# Patient Record
Sex: FEMALE | Race: WHITE | HISPANIC OR LATINO
[De-identification: names, ages, dates, MRNs, and addresses within clinical notes are randomized per-mention and may not be internally consistent; named-entity substitution may affect disease eponyms.]

---

## 2018-10-22 ENCOUNTER — RESULT REVIEW (OUTPATIENT)
Age: 53
End: 2018-10-22

## 2018-11-21 ENCOUNTER — RESULT REVIEW (OUTPATIENT)
Age: 53
End: 2018-11-21

## 2019-02-06 ENCOUNTER — RESULT REVIEW (OUTPATIENT)
Age: 54
End: 2019-02-06

## 2019-02-06 ENCOUNTER — APPOINTMENT (OUTPATIENT)
Dept: BREAST CENTER | Facility: CLINIC | Age: 54
End: 2019-02-06

## 2019-02-06 VITALS
DIASTOLIC BLOOD PRESSURE: 75 MMHG | WEIGHT: 128 LBS | BODY MASS INDEX: 24.17 KG/M2 | SYSTOLIC BLOOD PRESSURE: 126 MMHG | HEIGHT: 61 IN | HEART RATE: 67 BPM

## 2019-02-06 DIAGNOSIS — Z78.9 OTHER SPECIFIED HEALTH STATUS: ICD-10-CM

## 2019-02-06 DIAGNOSIS — R92.8 OTHER ABNORMAL AND INCONCLUSIVE FINDINGS ON DIAGNOSTIC IMAGING OF BREAST: ICD-10-CM

## 2019-02-06 DIAGNOSIS — Z80.1 FAMILY HISTORY OF MALIGNANT NEOPLASM OF TRACHEA, BRONCHUS AND LUNG: ICD-10-CM

## 2019-02-06 DIAGNOSIS — Z80.3 FAMILY HISTORY OF MALIGNANT NEOPLASM OF BREAST: ICD-10-CM

## 2019-02-06 PROBLEM — Z00.00 ENCOUNTER FOR PREVENTIVE HEALTH EXAMINATION: Status: ACTIVE | Noted: 2019-02-06

## 2019-02-06 RX ORDER — MINOCYCLINE HYDROCHLORIDE 100 MG/1
100 CAPSULE ORAL
Qty: 60 | Refills: 0 | Status: ACTIVE | COMMUNITY
Start: 2018-06-28

## 2019-02-06 NOTE — HISTORY OF PRESENT ILLNESS
[FreeTextEntry1] : 54 y/o female referred for consultation for abnormal u/s and ? of rupture of left breast implant per Dr. Jeanette Perry MD.  \par Pt states her left implant has been getting firmer in recent past. [facility] \par 10/31/18 CDR ([facility]) Bilt mmg and u/s, Bilat Retropec implants, 10 mm mass in right RA decreased from 2013, correlates with complex cyst on u/s. Prominent high density LN in bilat axilla up to 1.7 cm on R and 1.8 cm on left. BR2\par Bilat u/s with R LN 2.4 compatible with silicon in LN, Left 9:00, 1.5 N complex cysts 1.4 and 6 mm and 10:00 7N 5 mm. 6 mos f.u u/s rec for probable silicon globules, RA 3x4 complex cyst for which 6 mos f.u rec. Multiple area in left compatible with silicon.  \par 12/1418 [facility] MRI, Bilat retropec implants without evidence of rupture. Left with fluid around implant, susp left axillary LN.  Rec as of fluid and bx.  Large cell lymphoma cannot be excluded.   \par 1/16/19 [facility], left u/s, no evidence of CA, consistent findings of intracapsular and extracapsular rupture. Surgical consult rec, BR2. \par PGM with breast cancer at 64, mother with metastatic cancer-? primary.

## 2019-02-06 NOTE — ASSESSMENT
[FreeTextEntry1] : 54 y/o female referred for consultation for abnormal u/s and ? of rupture of left breast implant per Dr. Jeanette Perry MD.  \par Pt states her left implant has been getting firmer in recent past. They were placed many years in [facility]. \par 10/31/18 CDR ([facility]) Bilt mmg and u/s, Bilat Retropec implants, 10 mm mass in right RA decreased from 2013, correlates with complex cyst on u/s. Prominent high density LN in bilat axilla up to 1.7 cm on R and 1.8 cm on left. BR2\par Bilat u/s with R LN 2.4 compatible with silicon in LN, Left 9:00, 1.5 N complex cysts 1.4 and 6 mm and 10:00 7N 5 mm. 6 mos f.u u/s rec for probable silicon globules, RA 3x4 complex cyst for which 6 mos f.u rec. Multiple area in left compatible with silicon.  \par 12/1418 [facility] MRI, Bilat retropec implants without evidence of rupture. Left with fluid around implant, susp left axillary LN.  Rec as of fluid and bx.  Large cell lymphoma cannot be excluded.   \par 1/16/19 [facility], left u/s, no evidence of CA, consistent findings of intracapsular and extracapsular rupture. Surgical consult rec, BR2. \par PGM with breast cancer at 64, mother with metastatic cancer-? primary.\par CBE: Right implant, Left capsule, no adenopathy.

## 2019-02-06 NOTE — PHYSICAL EXAM
[Normocephalic] : normocephalic [Atraumatic] : atraumatic [Supple] : supple [No Supraclavicular Adenopathy] : no supraclavicular adenopathy [Examined in the supine and seated position] : examined in the supine and seated position [Asymmetrical] : asymmetrical [Bra Size: ___] : Bra Size: [unfilled] [No dominant masses] : no dominant masses in right breast  [No dominant masses] : no dominant masses left breast [No Nipple Retraction] : no left nipple retraction [No Nipple Discharge] : no left nipple discharge [No Axillary Lymphadenopathy] : no left axillary lymphadenopathy [No Edema] : no edema [No Rashes] : no rashes [No Ulceration] : no ulceration [de-identified] : Bilat breast implants [de-identified] : capsular contracture

## 2019-02-06 NOTE — DATA REVIEWED
[FreeTextEntry1] : 10/31/18 CDR ([facility]) Bilt mmg and u/s, Bilat Retropec implants, 10 mm mass in right RA decreased from 2013, correlates with complex cyst on u/s. Prominent high density LN in bilat axilla up to 1.7 cm on R and 1.8 cm on left. BR2\par Bilat u/s with R LN 2.4 compatible with silicon in LN, Left 9:00, 1.5 N complex cysts 1.4 and 6 mm and 10:00 7N 5 mm. 6 mos f.u u/s rec for probable silicon globules.  RA 3x4 complex cyst for which 6 mos f.u rec. Multiple area in left compatible with silicon. BR3\par 12/1418 [facility] MRI, Bilat retropec implants without evidence of rupture. Left with fluid around implant, susp left axillary LN.  Rec as of fluid and bx.  Large cell lymphoma cannot be excluded.   \par 1/16/19 [facility], left u/s, no evidence of CA, consistent findings of intracapsular and extracapsular rupture. Surgical consult rec, BR2. \par

## 2019-02-06 NOTE — PAST MEDICAL HISTORY
[Perimenopausal] : The patient is perimenopausal [Menarche Age ____] : age at menarche was [unfilled] [Approximately ___] : the LMP was approximately [unfilled] [Living ___] : Living: [unfilled] [Age At Live Birth ___] : Age at live birth: [unfilled] [FreeTextEntry6] : none [FreeTextEntry7] : 5 years [FreeTextEntry8] : 6 mos

## 2019-02-07 ENCOUNTER — MESSAGE (OUTPATIENT)
Age: 54
End: 2019-02-07

## 2019-03-12 ENCOUNTER — APPOINTMENT (OUTPATIENT)
Dept: PLASTIC SURGERY | Facility: CLINIC | Age: 54
End: 2019-03-12

## 2019-03-13 ENCOUNTER — RESULT REVIEW (OUTPATIENT)
Age: 54
End: 2019-03-13

## 2019-03-19 ENCOUNTER — APPOINTMENT (OUTPATIENT)
Dept: PLASTIC SURGERY | Facility: CLINIC | Age: 54
End: 2019-03-19
Payer: COMMERCIAL

## 2019-03-19 VITALS
BODY MASS INDEX: 24.55 KG/M2 | HEART RATE: 68 BPM | DIASTOLIC BLOOD PRESSURE: 71 MMHG | SYSTOLIC BLOOD PRESSURE: 103 MMHG | WEIGHT: 130 LBS | HEIGHT: 61 IN

## 2019-03-19 PROCEDURE — 99204 OFFICE O/P NEW MOD 45 MIN: CPT

## 2019-03-19 NOTE — PHYSICAL EXAM
[Bra Size: _______] : Bra Size: [unfilled] [NI] : Normal [de-identified] : L>R, bilateral submuscular implants in place, no masses, no rashes, no scars, nipple retraction, ulcers, high grade capsular contracture bilaterally, right grade II/III, left breast grade III/IV, BD 14cm Rt, LtBD 13cm breast mounds touching medially "pseudo" synmastia    [de-identified] : soft, NT, ND, no rashes, no ulceration

## 2019-03-19 NOTE — HISTORY OF PRESENT ILLNESS
[FreeTextEntry1] : 52 y/o F referred from Dr. Ballard for consultation for possible rupture of left breast implant. Patient states her left breast implant has been getting firmer in the recent past. They were placed many years ago in [facility] (2003). MRI performed on 12/14/18 shows b/l retropectoral implants w/o evidence of rupture and fluid surrounding the left breast implant, plus morphologically suspicious left axillary lymph nodes. US performed on 1/16/19 shows findings consistent with intracapsular and extracapsular rupture of the patient’s left breast implant.

## 2019-03-19 NOTE — ASSESSMENT
[FreeTextEntry1] : Ms. PAK  has severe capsule contracture, and pain with asymmetry and ruptured silicone implant she is interested in removal of the implants and exchange, as well as total capsulectomy with new implant. \par \par I reviewed with the patient the risks benefits and alternatives of breast implant exchange with silicone implants. The goal of the operation is to create a more enlarged breast mound by placing the implant in either the subglandular or dual plane/sub muscular position. The access incision can either be IMF or periareolar. IMF incision has less disruption of the breast gland ducts and therefore theoretically less chance of bacterial contamination and biofilm creation which is thought to be associated with capsular contracture. Submuscular placement of the implant has less risk of visible or palpable implant in the upper pole as well as theoretical benefit of decreased capsule contracture, however, it is associated with muscle animation deformity and greater postop pain. We discussed the differences between saline implants and silicone implants and the FDA recommendation for MRI surveillance postoperatively to rule out ruptured silicone implants. The risks of the surgery include the risk implant related complications such as implant infection, implant rupture, capsule contracture, implant malposition, palpable and visible rippling and need for revision surgery as well as asymmetry of the breasts, loss of nipple sensation ( or hypersensitivity), loss of the ability to breast feed, need for specialized techniques during mammography for breast cancer screening and likelihood that she will need revision surgery in the future as the implants are not designed to last a lifetime. \par \par I explained that the final size will be determined intra-operatively but she states that she wants to be bigger on the right and smaller on the left - most likely this will be achieved with capsulectomy and implant exchange. We will likely not be able to make a wider space between the breast mounds if she wants to maintain her current size or even be larger.\par \par

## 2019-03-20 DIAGNOSIS — R59.0 LOCALIZED ENLARGED LYMPH NODES: ICD-10-CM

## 2019-04-05 ENCOUNTER — RESULT REVIEW (OUTPATIENT)
Age: 54
End: 2019-04-05

## 2019-04-05 ENCOUNTER — APPOINTMENT (OUTPATIENT)
Dept: ULTRASOUND IMAGING | Facility: HOSPITAL | Age: 54
End: 2019-04-05
Payer: COMMERCIAL

## 2019-04-05 ENCOUNTER — APPOINTMENT (OUTPATIENT)
Dept: ULTRASOUND IMAGING | Facility: HOSPITAL | Age: 54
End: 2019-04-05

## 2019-04-05 ENCOUNTER — OUTPATIENT (OUTPATIENT)
Dept: OUTPATIENT SERVICES | Facility: HOSPITAL | Age: 54
LOS: 1 days | End: 2019-04-05
Payer: COMMERCIAL

## 2019-04-05 PROCEDURE — 19000 PUNCTURE ASPIR CYST BREAST: CPT | Mod: LT

## 2019-04-05 PROCEDURE — 76942 ECHO GUIDE FOR BIOPSY: CPT | Mod: 26

## 2019-04-08 LAB — HEMATOPATHOLOGY REPORT: SIGNIFICANT CHANGE UP

## 2019-05-03 ENCOUNTER — RESULT REVIEW (OUTPATIENT)
Age: 54
End: 2019-05-03

## 2019-05-03 ENCOUNTER — OUTPATIENT (OUTPATIENT)
Dept: OUTPATIENT SERVICES | Facility: HOSPITAL | Age: 54
LOS: 1 days | Discharge: HOME | End: 2019-05-03
Payer: COMMERCIAL

## 2019-05-03 PROCEDURE — 19371 PERI-IMPLT CAPSLC BRST COMPL: CPT | Mod: 50

## 2019-05-03 PROCEDURE — 19325 BREAST AUGMENTATION W/IMPLT: CPT | Mod: 50

## 2019-05-03 PROCEDURE — 19330 RMVL RUPTURED BREAST IMPLANT: CPT | Mod: LT,59

## 2019-05-03 RX ORDER — OXYCODONE AND ACETAMINOPHEN 5; 325 MG/1; MG/1
5-325 TABLET ORAL
Qty: 20 | Refills: 0 | Status: ACTIVE | COMMUNITY
Start: 2019-05-03 | End: 1900-01-01

## 2019-05-07 ENCOUNTER — APPOINTMENT (OUTPATIENT)
Dept: PLASTIC SURGERY | Facility: CLINIC | Age: 54
End: 2019-05-07
Payer: COMMERCIAL

## 2019-05-07 PROCEDURE — 99024 POSTOP FOLLOW-UP VISIT: CPT

## 2019-05-14 LAB — SURGICAL PATHOLOGY STUDY: SIGNIFICANT CHANGE UP

## 2019-05-17 ENCOUNTER — TRANSCRIPTION ENCOUNTER (OUTPATIENT)
Age: 54
End: 2019-05-17

## 2019-05-17 ENCOUNTER — INPATIENT (INPATIENT)
Facility: HOSPITAL | Age: 54
LOS: 0 days | Discharge: HOME | DRG: 580 | End: 2019-05-17
Attending: PLASTIC SURGERY | Admitting: PLASTIC SURGERY
Payer: COMMERCIAL

## 2019-05-17 ENCOUNTER — CHART COPY (OUTPATIENT)
Age: 54
End: 2019-05-17

## 2019-05-17 VITALS
HEART RATE: 86 BPM | WEIGHT: 119.93 LBS | DIASTOLIC BLOOD PRESSURE: 91 MMHG | RESPIRATION RATE: 16 BRPM | OXYGEN SATURATION: 100 % | SYSTOLIC BLOOD PRESSURE: 126 MMHG | TEMPERATURE: 98 F | HEIGHT: 61 IN

## 2019-05-17 VITALS
SYSTOLIC BLOOD PRESSURE: 129 MMHG | DIASTOLIC BLOOD PRESSURE: 74 MMHG | HEART RATE: 90 BPM | RESPIRATION RATE: 12 BRPM | OXYGEN SATURATION: 95 %

## 2019-05-17 VITALS
BODY MASS INDEX: 24.55 KG/M2 | WEIGHT: 130 LBS | HEIGHT: 61 IN | HEART RATE: 78 BPM | TEMPERATURE: 97.1 F | SYSTOLIC BLOOD PRESSURE: 121 MMHG | DIASTOLIC BLOOD PRESSURE: 79 MMHG

## 2019-05-17 DIAGNOSIS — M96.841 POSTPROCEDURAL HEMATOMA OF A MUSCULOSKELETAL STRUCTURE FOLLOWING OTHER PROCEDURE: ICD-10-CM
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
## 2019-05-17 DIAGNOSIS — L76.32 POSTPROCEDURAL HEMATOMA OF SKIN AND SUBCUTANEOUS TISSUE FOLLOWING OTHER PROCEDURE: ICD-10-CM

## 2019-05-17 DIAGNOSIS — T85.41XA BREAKDOWN (MECHANICAL) OF BREAST PROSTHESIS AND IMPLANT, INITIAL ENCOUNTER: ICD-10-CM

## 2019-05-17 LAB
ALBUMIN SERPL ELPH-MCNC: 4.1 G/DL — SIGNIFICANT CHANGE UP (ref 3.3–5)
ALP SERPL-CCNC: 85 U/L — SIGNIFICANT CHANGE UP (ref 40–120)
ALT FLD-CCNC: 13 U/L — SIGNIFICANT CHANGE UP (ref 10–45)
ANION GAP SERPL CALC-SCNC: 9 MMOL/L — SIGNIFICANT CHANGE UP (ref 5–17)
APTT BLD: 29.4 SEC — SIGNIFICANT CHANGE UP (ref 27.5–36.3)
AST SERPL-CCNC: 28 U/L — SIGNIFICANT CHANGE UP (ref 10–40)
BASOPHILS # BLD AUTO: 0.03 K/UL — SIGNIFICANT CHANGE UP (ref 0–0.2)
BASOPHILS NFR BLD AUTO: 0.6 % — SIGNIFICANT CHANGE UP (ref 0–2)
BILIRUB SERPL-MCNC: 1.2 MG/DL — SIGNIFICANT CHANGE UP (ref 0.2–1.2)
BLD GP AB SCN SERPL QL: NEGATIVE — SIGNIFICANT CHANGE UP
BUN SERPL-MCNC: 12 MG/DL — SIGNIFICANT CHANGE UP (ref 7–23)
CALCIUM SERPL-MCNC: 9.3 MG/DL — SIGNIFICANT CHANGE UP (ref 8.4–10.5)
CHLORIDE SERPL-SCNC: 106 MMOL/L — SIGNIFICANT CHANGE UP (ref 96–108)
CO2 SERPL-SCNC: 25 MMOL/L — SIGNIFICANT CHANGE UP (ref 22–31)
CREAT SERPL-MCNC: 0.52 MG/DL — SIGNIFICANT CHANGE UP (ref 0.5–1.3)
EOSINOPHIL # BLD AUTO: 0.16 K/UL — SIGNIFICANT CHANGE UP (ref 0–0.5)
EOSINOPHIL NFR BLD AUTO: 3 % — SIGNIFICANT CHANGE UP (ref 0–6)
GLUCOSE SERPL-MCNC: 94 MG/DL — SIGNIFICANT CHANGE UP (ref 70–99)
HCT VFR BLD CALC: 31.7 % — LOW (ref 34.5–45)
HGB BLD-MCNC: 9.6 G/DL — LOW (ref 11.5–15.5)
IMM GRANULOCYTES NFR BLD AUTO: 0.2 % — SIGNIFICANT CHANGE UP (ref 0–1.5)
INR BLD: 1.04 — SIGNIFICANT CHANGE UP (ref 0.88–1.16)
LYMPHOCYTES # BLD AUTO: 0.93 K/UL — LOW (ref 1–3.3)
LYMPHOCYTES # BLD AUTO: 17.3 % — SIGNIFICANT CHANGE UP (ref 13–44)
MCHC RBC-ENTMCNC: 26.8 PG — LOW (ref 27–34)
MCHC RBC-ENTMCNC: 30.3 GM/DL — LOW (ref 32–36)
MCV RBC AUTO: 88.5 FL — SIGNIFICANT CHANGE UP (ref 80–100)
MONOCYTES # BLD AUTO: 0.49 K/UL — SIGNIFICANT CHANGE UP (ref 0–0.9)
MONOCYTES NFR BLD AUTO: 9.1 % — SIGNIFICANT CHANGE UP (ref 2–14)
NEUTROPHILS # BLD AUTO: 3.76 K/UL — SIGNIFICANT CHANGE UP (ref 1.8–7.4)
NEUTROPHILS NFR BLD AUTO: 69.8 % — SIGNIFICANT CHANGE UP (ref 43–77)
NRBC # BLD: 0 /100 WBCS — SIGNIFICANT CHANGE UP (ref 0–0)
PLATELET # BLD AUTO: 356 K/UL — SIGNIFICANT CHANGE UP (ref 150–400)
POTASSIUM SERPL-MCNC: 4.8 MMOL/L — SIGNIFICANT CHANGE UP (ref 3.5–5.3)
POTASSIUM SERPL-SCNC: 4.8 MMOL/L — SIGNIFICANT CHANGE UP (ref 3.5–5.3)
PROT SERPL-MCNC: 8 G/DL — SIGNIFICANT CHANGE UP (ref 6–8.3)
PROTHROM AB SERPL-ACNC: 11.8 SEC — SIGNIFICANT CHANGE UP (ref 10–12.9)
RBC # BLD: 3.58 M/UL — LOW (ref 3.8–5.2)
RBC # FLD: 16 % — HIGH (ref 10.3–14.5)
RH IG SCN BLD-IMP: POSITIVE — SIGNIFICANT CHANGE UP
SODIUM SERPL-SCNC: 140 MMOL/L — SIGNIFICANT CHANGE UP (ref 135–145)
WBC # BLD: 5.38 K/UL — SIGNIFICANT CHANGE UP (ref 3.8–10.5)
WBC # FLD AUTO: 5.38 K/UL — SIGNIFICANT CHANGE UP (ref 3.8–10.5)

## 2019-05-17 PROCEDURE — 19000 PUNCTURE ASPIR CYST BREAST: CPT

## 2019-05-17 PROCEDURE — 10005 FNA BX W/US GDN 1ST LES: CPT

## 2019-05-17 PROCEDURE — 76942 ECHO GUIDE FOR BIOPSY: CPT

## 2019-05-17 PROCEDURE — 99285 EMERGENCY DEPT VISIT HI MDM: CPT

## 2019-05-17 PROCEDURE — 35820 EXPLORE CHEST VESSELS: CPT | Mod: 78

## 2019-05-17 RX ORDER — METOCLOPRAMIDE HCL 10 MG
10 TABLET ORAL ONCE
Refills: 0 | Status: DISCONTINUED | OUTPATIENT
Start: 2019-05-17 | End: 2019-05-17

## 2019-05-17 RX ORDER — HYDROMORPHONE HYDROCHLORIDE 2 MG/ML
0.5 INJECTION INTRAMUSCULAR; INTRAVENOUS; SUBCUTANEOUS
Refills: 0 | Status: DISCONTINUED | OUTPATIENT
Start: 2019-05-17 | End: 2019-05-17

## 2019-05-17 RX ORDER — ONDANSETRON 8 MG/1
4 TABLET, FILM COATED ORAL EVERY 6 HOURS
Refills: 0 | Status: DISCONTINUED | OUTPATIENT
Start: 2019-05-17 | End: 2019-05-17

## 2019-05-17 RX ORDER — OXYCODONE HYDROCHLORIDE 5 MG/1
5 TABLET ORAL EVERY 4 HOURS
Refills: 0 | Status: DISCONTINUED | OUTPATIENT
Start: 2019-05-17 | End: 2019-05-17

## 2019-05-17 RX ORDER — ACETAMINOPHEN 500 MG
2 TABLET ORAL
Qty: 0 | Refills: 0 | DISCHARGE

## 2019-05-17 RX ORDER — SODIUM CHLORIDE 9 MG/ML
1000 INJECTION, SOLUTION INTRAVENOUS
Refills: 0 | Status: DISCONTINUED | OUTPATIENT
Start: 2019-05-17 | End: 2019-05-17

## 2019-05-17 RX ORDER — DIPHENHYDRAMINE HCL 50 MG
12.5 CAPSULE ORAL EVERY 4 HOURS
Refills: 0 | Status: DISCONTINUED | OUTPATIENT
Start: 2019-05-17 | End: 2019-05-17

## 2019-05-17 NOTE — BRIEF OPERATIVE NOTE - NSICDXBRIEFPREOP_GEN_ALL_CORE_FT
PRE-OP DIAGNOSIS:  Breast hematoma 17-May-2019 16:58:01  Lenny Dee  H/O breast augmentation 17-May-2019 16:57:43  Lenny Dee

## 2019-05-17 NOTE — ED PROVIDER NOTE - CLINICAL SUMMARY MEDICAL DECISION MAKING FREE TEXT BOX
Pt with recent breast reimplantation 5/3 now with left breast swelling and ecchymoses sent in for preop for OR intervention.  Discussed with Dr. Lerman.

## 2019-05-17 NOTE — ED PROVIDER NOTE - OBJECTIVE STATEMENT
52 y/o f with PMH of breast augmentation originally 15 years prior now with replacement on 5/3 by Dr. Lerman presents today for swelling of left breast and bruising.  Sent by Dr. Lerman for OR intervention.  Denies fever or chills.  No arm pain or arm swelling.  No chest pain or shortness of breath.

## 2019-05-17 NOTE — DISCHARGE NOTE PROVIDER - NSDCFUADDAPPT_GEN_ALL_CORE_FT
Please follow up with Dr. Lerman within x1 week after discharge from the hospital. You may call his office to schedule an appointment.

## 2019-05-17 NOTE — BRIEF OPERATIVE NOTE - OPERATION/FINDINGS
Preoperative Diagnosis: s/p bilateral implant exchange and capsulectomies (5/3) c/b left breast hematoma  Procedure: Evacuation of left breast hematoma (400-500cc); Removal of implant; Insertion of new implant  Postoperative Diagnosis: s/p bilateral implant exchange and capsulectomies (5/3) c/b left breast hematoma

## 2019-05-17 NOTE — ED ADULT NURSE NOTE - CHPI ED NUR SYMPTOMS NEG
no vomiting/no tingling/no chills/no fever/no decreased eating/drinking/no nausea/no dizziness/no weakness

## 2019-05-17 NOTE — H&P ADULT - ASSESSMENT
53F s/p implant exchange on 5/3 by Dr. Lerman presents to [facility] emergency department with left breast hematoma.  >> RTOR for left breast hematoma evacuation.  >> Labs pending.  >> NPO  >> IVF    [facility] Plastic Surgery Pager -- [phone]  [facility] Plastic Surgery Pager -- [phone]  [facility] Plastic Surgery Pager -- [phone]

## 2019-05-17 NOTE — DISCHARGE NOTE PROVIDER - HOSPITAL COURSE
53F s/p bilateral implant exchange and capsulectomies (5/3) c/b left breast hematoma (5/17) underwent evacuation of hematoma in the operating room at [facility]. The patient tolerated the procedure well. There were no complications.

## 2019-05-17 NOTE — H&P ADULT - HISTORY OF PRESENT ILLNESS
53F s/p implant exchange on 5/3 by Dr. Lerman presents to [facility] emergency department with left breast hematoma.

## 2019-05-17 NOTE — DISCHARGE NOTE PROVIDER - CARE PROVIDER_API CALL
Lerman, Oren Z (MD)  Plastic Surgery  [address] [place] [address]  Phone: [phone]  Fax: [phone]  Follow Up Time: 1 week

## 2019-05-17 NOTE — DISCHARGE NOTE NURSING/CASE MANAGEMENT/SOCIAL WORK - NSDCDPATPORTLINK_GEN_ALL_CORE
You can access the [facility] Portal, offered by [facility], by registering with the following website: [email or web address]

## 2019-05-17 NOTE — ED ADULT TRIAGE NOTE - CHIEF COMPLAINT QUOTE
sent down from Dr. Palumbo's office for pre-op workup.  Patient is s/p bilateral breast augmentation 2 wks ago.  c/o left breast hematoma / pain.

## 2019-05-17 NOTE — H&P ADULT - NSHPPHYSICALEXAM_GEN_ALL_CORE
>> General: Well-developed. Well-nourished. No acute distress.  >> Neck: Supple.  >> Cardiovascular: RRR. S1, S2.  >> Lungs: Bilateral breath sounds.   >> Breast: Left breast ecchymosis and marked swelling.  >> Abdomen: Soft. Non-tender. Non-distended.  >> Extremities: Unremarkable.

## 2019-05-17 NOTE — ED ADULT NURSE NOTE - OBJECTIVE STATEMENT
53 year old F patient with c/o of L breast pain s/p breast augmentation on may 3.  Pt states she had implants removed, bialterally and new ones placed.  Hematoma noted to L breast, states "everything just happened suddenly yesterday".  L breast appears swollen with multiple bruising.  Denies n/v/d/f.  NO discharge from nipples.

## 2019-05-21 ENCOUNTER — APPOINTMENT (OUTPATIENT)
Dept: PLASTIC SURGERY | Facility: CLINIC | Age: 54
End: 2019-05-21
Payer: COMMERCIAL

## 2019-05-21 DIAGNOSIS — T85.43XA LEAKAGE OF BREAST PROSTHESIS AND IMPLANT, INITIAL ENCOUNTER: ICD-10-CM

## 2019-05-21 PROCEDURE — 85610 PROTHROMBIN TIME: CPT

## 2019-05-21 PROCEDURE — C1789: CPT

## 2019-05-21 PROCEDURE — 85730 THROMBOPLASTIN TIME PARTIAL: CPT

## 2019-05-21 PROCEDURE — 36415 COLL VENOUS BLD VENIPUNCTURE: CPT

## 2019-05-21 PROCEDURE — 85025 COMPLETE CBC W/AUTO DIFF WBC: CPT

## 2019-05-21 PROCEDURE — 99024 POSTOP FOLLOW-UP VISIT: CPT

## 2019-05-21 PROCEDURE — 86901 BLOOD TYPING SEROLOGIC RH(D): CPT

## 2019-05-21 PROCEDURE — 99285 EMERGENCY DEPT VISIT HI MDM: CPT

## 2019-05-21 PROCEDURE — 80053 COMPREHEN METABOLIC PANEL: CPT

## 2019-05-21 PROCEDURE — 86900 BLOOD TYPING SEROLOGIC ABO: CPT

## 2019-05-21 PROCEDURE — 86850 RBC ANTIBODY SCREEN: CPT

## 2019-05-21 PROCEDURE — 84702 CHORIONIC GONADOTROPIN TEST: CPT

## 2019-05-21 RX ORDER — DOXYCYCLINE HYCLATE 100 MG/1
100 CAPSULE ORAL
Qty: 7 | Refills: 0 | Status: ACTIVE | COMMUNITY
Start: 2019-05-17

## 2019-05-21 RX ORDER — IBUPROFEN 600 MG/1
600 TABLET, FILM COATED ORAL
Qty: 40 | Refills: 0 | Status: ACTIVE | COMMUNITY
Start: 2019-03-13

## 2019-05-21 NOTE — SURGICAL HISTORY
[de-identified] : 5/3/19: s/p b/l capsulectomy, implant removal and implant replacement on 5/3/19. [de-identified] : 5/17/19--left breast evacuation of hematoma washout with implant replacement

## 2019-05-21 NOTE — PHYSICAL EXAM
[NI] : Normal [de-identified] : BL implants in good position, IMF incisions c/d/i, no collection or infection, normal PO ecchymosis L >R

## 2019-05-21 NOTE — HISTORY OF PRESENT ILLNESS
[FreeTextEntry1] : S/p b/l capsulectomy, implant removal and implant replacement on 5/3/19 complicated by post op hematoma. Now POD 4 s/p evacuation of hematoma with implant removal and exchange on 5/17/19.\par Doing well. Pain is controlled. No f/c/n/v/d. She continues taking prophylactic Doxycycline x 1 week. \par PO instructions and activity restrictions discussed with patient \par

## 2019-05-21 NOTE — ASSESSMENT
[FreeTextEntry1] : PO Instructions reviewed\par Compression / Sports Bra\par No strenuous activity\par F/U in 1 week

## 2019-05-28 ENCOUNTER — APPOINTMENT (OUTPATIENT)
Dept: PLASTIC SURGERY | Facility: CLINIC | Age: 54
End: 2019-05-28

## 2019-05-28 NOTE — HISTORY OF PRESENT ILLNESS
[FreeTextEntry1] : Here for 2nd PO visit s/p evacuation of hematoma with implant removal and exchange on 5/17/19\par Doing well. Pain is controlled. No f/c/n/v/d.\par

## 2019-05-28 NOTE — ASSESSMENT
[FreeTextEntry1] : s/p evacuation of hematoma with implant removal and exchange on 5/17/19\par Healing well\par PO Instructions reviewed\par Compression / Sports Bra\par No strenuous activity\par F/U in 2 weeks

## 2019-05-28 NOTE — SURGICAL HISTORY
[de-identified] : 5/3/19: s/p b/l capsulectomy, implant removal and implant replacement on 5/3/19. [de-identified] : 5/17/19--left breast evacuation of hematoma washout with implant replacement

## 2019-05-28 NOTE — PHYSICAL EXAM
[de-identified] : BL implants in good position, IMF incisions c/d/i, no collection or infection, normal PO ecchymosis L >R [NI] : Normal
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
## 2019-05-29 DIAGNOSIS — N64.4 MASTODYNIA: ICD-10-CM
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
## 2019-05-29 DIAGNOSIS — L76.31 POSTPROCEDURAL HEMATOMA OF SKIN AND SUBCUTANEOUS TISSUE FOLLOWING A DERMATOLOGIC PROCEDURE: ICD-10-CM

## 2019-06-07 NOTE — ASSESSMENT
[FreeTextEntry1] : POD 4 s/p s/p b/l capsulectomy, implant removal and implant replacement on 5/3/19\par Healing well\par activity restrictions discussed with patient\par f.u in 3 weeks

## 2019-06-07 NOTE — HISTORY OF PRESENT ILLNESS
[FreeTextEntry1] : POD 4\par Healing well. Implants in good position.\par s/p b/l capsulectomy, implant removal and implant replacement on 5/3/19\par Doing well. Pain is controlled. No f/c/n/v/d.\par Activity restrictions discussed with patient \par Follow up in ***

## 2019-06-07 NOTE — SURGICAL HISTORY
[de-identified] : 5/3/19: s/p b/l capsulectomy, implant removal and implant replacement on 5/3/19.

## 2019-06-07 NOTE — PHYSICAL EXAM
[NI] : Normal [de-identified] : BL implants in good position, IMF incisions c/d/i, no collection or infection

## 2019-06-25 ENCOUNTER — APPOINTMENT (OUTPATIENT)
Dept: PLASTIC SURGERY | Facility: CLINIC | Age: 54
End: 2019-06-25
Payer: COMMERCIAL

## 2019-06-25 DIAGNOSIS — T85.44XA CAPSULAR CONTRACTURE OF BREAST IMPLANT, INITIAL ENCOUNTER: ICD-10-CM

## 2019-06-25 PROCEDURE — 99024 POSTOP FOLLOW-UP VISIT: CPT

## 2019-06-25 NOTE — PHYSICAL EXAM
[NI] : Normal [de-identified] : BL implants in good position, IMF incisions c/d/i, no collection or infection, baker grade 0, healing well, no synmastia

## 2019-06-25 NOTE — SURGICAL HISTORY
[de-identified] : 5/17/19--left breast evacuation of hematoma washout with implant replacement  [de-identified] : 5/3/19: s/p b/l capsulectomy, implant removal and implant replacement on 5/3/19.

## 2019-06-25 NOTE — ASSESSMENT
[FreeTextEntry1] : s/p evacuation of hematoma with implant removal and exchange on 5/17/19\par Healing well\par Continue silicone strips/cream\par I reviewed scar management treatments including sun avoidance, sunblock, moisturizer, and massage.\par F/U in 6 months

## 2019-06-25 NOTE — HISTORY OF PRESENT ILLNESS
[FreeTextEntry1] : Here for 3rd PO visit s/p evacuation of hematoma with implant removal and exchange on 5/17/19\par Doing well. Pain is controlled. Using silicone cream to incisions.

## 2020-02-10 NOTE — SURGICAL HISTORY
[de-identified] : 5/3/19: s/p b/l capsulectomy, implant removal and implant replacement on 5/3/19. [de-identified] : 5/17/19--left breast evacuation of hematoma washout with implant replacement

## 2020-02-10 NOTE — HISTORY OF PRESENT ILLNESS
[FreeTextEntry1] : Here for 4th PO visit 9 months s/p evacuation of hematoma with implant removal and exchange on 5/17/19\par Doing well. Pain is controlled. Using silicone cream to incisions.

## 2020-02-10 NOTE — PHYSICAL EXAM
[NI] : Normal [de-identified] : BL implants in good position, IMF incisions c/d/i, no collection or infection, baker grade 0, healing well, no synmastia

## 2020-02-11 ENCOUNTER — APPOINTMENT (OUTPATIENT)
Dept: PLASTIC SURGERY | Facility: CLINIC | Age: 55
End: 2020-02-11

## 2020-02-12 NOTE — PHYSICAL EXAM
[NI] : Normal [de-identified] : BL implants in good position, IMF incisions c/d/i, no collection or infection, baker grade 0, healing well, no synmastia

## 2020-02-12 NOTE — SURGICAL HISTORY
[de-identified] : 5/3/19: s/p b/l capsulectomy, implant removal and implant replacement on 5/3/19. [de-identified] : 5/17/19--left breast evacuation of hematoma washout with implant replacement

## 2020-02-18 ENCOUNTER — APPOINTMENT (OUTPATIENT)
Dept: PLASTIC SURGERY | Facility: CLINIC | Age: 55
End: 2020-02-18

## 2020-03-17 ENCOUNTER — APPOINTMENT (OUTPATIENT)
Dept: PLASTIC SURGERY | Facility: CLINIC | Age: 55
End: 2020-03-17

## 2020-05-25 NOTE — HISTORY OF PRESENT ILLNESS
[FreeTextEntry1] : 55 y/o female 1 year PO s/p evacuation of hematoma with implant removal and exchange on 5/17/19.

## 2020-05-25 NOTE — SURGICAL HISTORY
[de-identified] : 5/17/19--left breast evacuation of hematoma washout with implant replacement  [de-identified] : 5/3/19: s/p b/l capsulectomy, implant removal and implant replacement on 5/3/19.

## 2020-05-26 ENCOUNTER — APPOINTMENT (OUTPATIENT)
Dept: PLASTIC SURGERY | Facility: CLINIC | Age: 55
End: 2020-05-26

## 2020-07-10 NOTE — END OF VISIT
Subjective:   Patient ID:  Maria Ines Ac is a 36 y.o. female who presents for follow-up of Chest Pain  Maria Ines Ac is a 35 y.o. female is a new patient who presents for evaluation of Chest Pain; Palpitations; Shortness of Breath; and Surgery Clearance     HPI:   Pre op evaluation for breast reduction. Dr. Brian  Navarro a murmur.  DM, Morbid obesity.   Patient would get SOB on going up a flight of stair.   No chest pain, Orthopnea, PND of heart failure symptoms.   c/o palpitations or fluttering in the chest        HPI:   Return follow up to discuss her holter patient still c/o significant palpitations on and off  On holter there is frequent PVCs and sinus tachycardia   There were very frequent PVCs totalling 6794 and averaging 141.54 per hour. The ventricular arrhythmias percentage was 2.4. There were 1 couplets.   No chest pain, Orthopnea, PND of heart failure symptoms.      Echo:  · Concentric left ventricular remodeling.  · Normal left ventricular systolic function. The estimated ejection fraction is 60%  · Grade I (mild) left ventricular diastolic dysfunction consistent with impaired relaxation. Normal left atrial pressure.  · Normal right ventricular systolic function.  · Normal central venous pressure (3 mm Hg).  · Patient's study rereviewed 9/25/2019- bubble study was performed and there was no evidence of right to left shunt      Stress Echo   · There were no arrhythmias during stress.  · Exercise capacity was below average.  · Chest discomfort was reported during exercise.  · The test was stopped secondary to fatigue  · The EKG portion of this study is negative for myocardial ischemia.  · Normal left ventricular systolic function. The estimated ejection fraction is 60%  · Normal LV diastolic function.  · Normal right ventricular systolic function.  · Normal central venous pressure (3 mm Hg).  · The estimated PA systolic pressure is 13 mm Hg  · The stress echo portion of this study is  "negative for myocardial ischemia.  · Patient  systolic at peak exercise.        HPI:   Patient c/o chest pain at rest and at exercise.   Sharp pain in the middle of the chest lasts hour. Comes on walking and does not matter what she is doing but stops at 1 hr.   Denies palpitations or fluttering in the chest.        Patient Active Problem List   Diagnosis    Abdominal pain, RLQ (right lower quadrant)    Dysphagia    Diabetes mellitus, type II    Constipation, chronic    Right hip pain    Intractable chronic migraine without aura and without status migrainosus    Essential hypertension    Palpitations    Chest pain, non-cardiac    Dyspnea on exertion    Dysfunctional voiding of urine    Severe obesity (BMI 35.0-39.9) with comorbidity    Muscle spasm    Mixed stress and urge urinary incontinence    Gastroesophageal reflux disease without esophagitis    Irritable bowel syndrome with diarrhea    Migrainous vertigo    Uncontrolled morning headache    Sleep arousal disorder    Hyperlipidemia    GERD (gastroesophageal reflux disease)    Family history of breast cancer    Family hx of ovarian malignancy    Weakness    Painful cervical ROM    Incomplete emptying of bladder    Bilateral occipital neuralgia    Medication overuse headache    Benign paroxysmal positional vertigo due to bilateral vestibular disorder     /73 (BP Location: Left arm, Patient Position: Sitting, BP Method: Large (Automatic))   Pulse 87   Ht 5' 2" (1.575 m)   Wt 99.7 kg (219 lb 12.8 oz)   LMP 11/17/2012 (LMP Unknown) Comment: Neg UPT  SpO2 96%   BMI 40.20 kg/m²   Body mass index is 40.2 kg/m².  CrCl cannot be calculated (Patient's most recent lab result is older than the maximum 7 days allowed.).    Lab Results   Component Value Date     06/02/2020    K 4.5 06/02/2020     06/02/2020    CO2 25 06/02/2020    BUN 9 06/02/2020    CREATININE 1.1 06/02/2020    GLU 90 06/02/2020    HGBA1C 5.2 " [>50% of Time Spent on Counseling for ____] : Greater than 50% of the encounter time was spent on counseling for [unfilled] 06/02/2020    AST 23 06/02/2020    ALT 22 06/02/2020    ALBUMIN 3.9 06/02/2020    PROT 7.9 06/02/2020    BILITOT 0.2 06/02/2020    WBC 8.22 11/03/2019    HGB 14.2 11/03/2019    HCT 45.7 11/03/2019    MCV 89 11/03/2019     11/03/2019    INR 0.9 01/05/2017    TSH 2.181 07/02/2019    CHOL 168 03/03/2020    HDL 43 03/03/2020    LDLCALC 57.0 (L) 03/03/2020    TRIG 340 (H) 03/03/2020       Current Outpatient Medications   Medication Sig    ACCU-CHEK AMAURY PLUS TEST STRP Strp USE TO TEST BLOOD GLUCOSE TID    ACCU-CHEK SOFTCLIX LANCETS Misc USE TO TEST BLOOD GLUCOSE TID    atorvastatin (LIPITOR) 40 MG tablet Take 40 mg by mouth once daily.    augmented betamethasone dipropionate (DIPROLENE-AF) 0.05 % ointment NATALEE TO HANDS \[facility]\""    BLOOD SUGAR DIAGNOSTIC (ACCU-CHEK AMAURY PLUS TEST STRP MISC) 1 strip by Misc.(Non-Drug; Combo Route) route 3 (three) times daily.    calcium-vitamin D3 500 mg(1,250mg) -200 unit per tablet Take 1 tablet by mouth 2 (two) times daily with meals.    ciclopirox (PENLAC) 8 % Soln Apply topically nightly.    diclofenac sodium (VOLTAREN) 1 % Gel Apply 2 g topically 4 (four) times daily. As needed for breast pain    dicyclomine (BENTYL) 10 MG capsule TAKE 2 CAPSULES(20 MG) BY MOUTH THREE TIMES DAILY BEFORE MEALS    docusate sodium (COLACE) 100 MG capsule Take 1 capsule (100 mg total) by mouth 2 (two) times daily.    estradiol (ESTRACE) 2 MG tablet Take 1 tablet (2 mg total) by mouth once daily.    FARXIGA 10 mg Tab TK 1 T PO QD    fish oil-omega-3 fatty acids 300-1,000 mg capsule Take 2 g by mouth once daily.    fluconazole (DIFLUCAN) 150 MG Tab     fluticasone propionate (FLONASE) 50 mcg/actuation nasal spray SHAKE LIQUID AND USE 2 SPRAYS(100 MCG) IN EACH NOSTRIL EVERY DAY    gabapentin (NEURONTIN) 300 MG capsule Take 300 mg (1 tablet) twice during the day, then three tablets (900 mg) at night before bed.    GLUCOPHAGE 500 mg tablet Take 500 mg by mouth 2 (two) times daily with  [Time Spent: ___ minutes] : I have spent [unfilled] minutes of face to face time with the patient meals.     hydrOXYzine HCl (ATARAX) 10 MG Tab TK 1 T PO BID PRF ITCH    ibuprofen (ADVIL,MOTRIN) 800 MG tablet 800 mg every 4 (four) hours as needed.     levothyroxine (SYNTHROID) 75 MCG tablet     magnesium oxide (MAG-OX) 400 mg (241.3 mg magnesium) tablet Take 1 tablet (400 mg total) by mouth 2 (two) times daily.    metoprolol tartrate (LOPRESSOR) 100 MG tablet TAKE 1 TABLET(100 MG) BY MOUTH TWICE DAILY    MULTIVIT-IRON-MIN-FOLIC ACID 3,500-18-0.4 UNIT-MG-MG ORAL CHEW Take 1 tablet by mouth once daily.     neomycin-polymyxin-hydrocortisone (CORTISPORIN) 3.5-10,000-10 mg-unit-mg/mL ophthalmic suspension INTILL 1 DROP INTO AFFECTED EYE QID    omeprazole (PRILOSEC OTC) 20 MG tablet Take 20 mg by mouth once daily.    pantoprazole (PROTONIX) 40 MG tablet TAKE 1 TABLET BY MOUTH EVERY DAY    phentermine (ADIPEX-P) 37.5 mg tablet TK 1 T PO  QAM    phentermine 37.5 MG capsule TK 1 C PO ONCE D IN THE MORNING    spironolactone (ALDACTONE) 25 MG tablet TK 1 T PO HS    topiramate (TOPAMAX) 50 MG tablet Take 2 tablets (100 mg total) by mouth every evening.    traZODone (DESYREL) 100 MG tablet TK 1 T PO HS    triamcinolone acetonide 0.1% (KENALOG) 0.1 % cream MIX WITH LAMISIL CREAM IN AQUAPHOR TO REACH 4OZ  AND APPLY ONCE DAILY    TRULICITY 1.5 mg/0.5 mL PnIj INJECT 1 PEN INTO THE SKIN Q WEEK    ziprasidone (GEODON) 80 MG capsule Take 80 mg by mouth 2 (two) times daily with meals.     ZOLOFT 100 mg tablet Take 200 mg by mouth once daily.      Current Facility-Administered Medications   Medication    onabotulinumtoxina injection 200 Units    onabotulinumtoxina injection 200 Units       Review of Systems   Constitution: Negative for chills, decreased appetite, malaise/fatigue, night sweats, weight gain and weight loss.   Eyes: Negative for blurred vision, double vision, visual disturbance and visual halos.   Cardiovascular: Positive for chest pain (sharp). Negative for claudication, cyanosis, dyspnea on  exertion, irregular heartbeat, leg swelling, near-syncope, orthopnea, palpitations, paroxysmal nocturnal dyspnea and syncope.   Respiratory: Negative for cough, hemoptysis, snoring, sputum production and wheezing.    Endocrine: Negative for cold intolerance, heat intolerance, polydipsia and polyphagia.   Hematologic/Lymphatic: Negative for adenopathy and bleeding problem. Does not bruise/bleed easily.   Skin: Negative for flushing, itching, poor wound healing and rash.   Musculoskeletal: Negative for arthritis, back pain, falls, gout, joint pain, joint swelling, muscle cramps, muscle weakness, myalgias, neck pain and stiffness.   Gastrointestinal: Negative for bloating, abdominal pain, anorexia, diarrhea, dysphagia, excessive appetite, flatus, hematemesis, jaundice, melena and nausea.   Genitourinary: Negative for hesitancy and incomplete emptying.   Neurological: Negative for aphonia, brief paralysis, difficulty with concentration, disturbances in coordination, excessive daytime sleepiness, dizziness, focal weakness, light-headedness, loss of balance and weakness.   Psychiatric/Behavioral: Negative for altered mental status, depression, hallucinations, hypervigilance, memory loss, substance abuse and suicidal ideas. The patient does not have insomnia and is not nervous/anxious.        Objective:   Physical Exam   Constitutional: She is oriented to person, place, and time. She appears well-developed and well-nourished. No distress.   HENT:   Head: Normocephalic and atraumatic.   Nose: Nose normal.   Mouth/Throat: Oropharynx is clear and moist. No oropharyngeal exudate.   Eyes: Pupils are equal, round, and reactive to light. Conjunctivae and EOM are normal. Right eye exhibits no discharge. Left eye exhibits no discharge. No scleral icterus.   Neck: Normal range of motion. Neck supple. No JVD present. No tracheal deviation present. No thyromegaly present.   Cardiovascular: Normal rate, regular rhythm, normal heart  sounds and intact distal pulses. Exam reveals no gallop and no friction rub.   No murmur heard.  Pulmonary/Chest: Effort normal and breath sounds normal. No stridor. No respiratory distress. She has no wheezes. She has no rales. She exhibits no tenderness.   Abdominal: Soft. Bowel sounds are normal. She exhibits no distension and no mass. There is no abdominal tenderness. There is no rebound and no guarding.   Musculoskeletal: Normal range of motion.         General: No tenderness or edema.   Lymphadenopathy:     She has no cervical adenopathy.   Neurological: She is alert and oriented to person, place, and time. She has normal reflexes. No cranial nerve deficit. She exhibits normal muscle tone. Coordination normal.   Skin: Skin is warm. No rash noted. She is not diaphoretic. No erythema. No pallor.   Psychiatric: She has a normal mood and affect. Her behavior is normal. Judgment and thought content normal.       Assessment:     1. Chest pain, unspecified type    2. Morbid obesity due to excess calories    3. Frequent PVCs    4. Type 2 diabetes mellitus with complication, without long-term current use of insulin    5. Essential hypertension        Plan:   Reassurance. Her chest pain is not cardiac and her palpitations have improved. Encourage diet and exercise.      Maria Ines was seen today for chest pain.    Diagnoses and all orders for this visit:    Chest pain, unspecified type    Morbid obesity due to excess calories    Frequent PVCs    Type 2 diabetes mellitus with complication, without long-term current use of insulin    Essential hypertension      RTC 1 yr

## 2022-02-22 NOTE — ED ADULT NURSE NOTE - BREATH SOUNDS, MLM
Chart reviewed for length of stay. Mostly good PO intake documented. No nutrition intervention indicated at this time. RD available via consult. Will continue to follow per protocol.      Clear

## 2023-07-03 NOTE — HISTORY OF PRESENT ILLNESS
[FreeTextEntry1] : Here for 4th PO visit s/p evacuation of hematoma with implant removal and exchange on 5/17/19\par Doing well. Pain is controlled. Using silicone cream to incisions.
no